# Patient Record
Sex: MALE | Race: WHITE | URBAN - METROPOLITAN AREA
[De-identification: names, ages, dates, MRNs, and addresses within clinical notes are randomized per-mention and may not be internally consistent; named-entity substitution may affect disease eponyms.]

---
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
## 2022-03-13 ENCOUNTER — NURSE TRIAGE (OUTPATIENT)
Dept: NURSING | Facility: CLINIC | Age: 22
End: 2022-03-13
Payer: COMMERCIAL

## 2022-03-13 NOTE — TELEPHONE ENCOUNTER
Pt wanting to know if he can get a COVID test and results by time of his flight tomorrow at 3pm - pt flying to [place], [place].  Pt advised to go to [facility] Covid testing website for times for COVID testing and to call his airline to assure if he does or does not need COVID testing to fly.    Brielle Lee RN, MA  [place] [facility] Advisor